# Patient Record
(demographics unavailable — no encounter records)

---

## 2024-12-05 NOTE — HEALTH RISK ASSESSMENT
[Yes] : Yes [2 - 3 times a week (3 pts)] : 2 - 3  times a week (3 points) [1 or 2 (0 pts)] : 1 or 2 (0 points) [Never (0 pts)] : Never (0 points) [No] : In the past 12 months have you used drugs other than those required for medical reasons? No [Little interest or pleasure doing things] : 1) Little interest or pleasure doing things [Feeling down, depressed, or hopeless] : 2) Feeling down, depressed, or hopeless [0] : 2) Feeling down, depressed, or hopeless: Not at all (0) [PHQ-2 Negative - No further assessment needed] : PHQ-2 Negative - No further assessment needed [Time Spent: ___ Minutes] : I spent [unfilled] minutes performing a depression screening for this patient. [Never] : Never [Patient reported mammogram was normal] : Patient reported mammogram was normal [Patient reported PAP Smear was normal] : Patient reported PAP Smear was normal [Patient reported colonoscopy was abnormal] : Patient reported colonoscopy was abnormal [Alone] : lives alone [Employed] : employed [Fully functional (bathing, dressing, toileting, transferring, walking, feeding)] : Fully functional (bathing, dressing, toileting, transferring, walking, feeding) [Fully functional (using the telephone, shopping, preparing meals, housekeeping, doing laundry, using] : Fully functional and needs no help or supervision to perform IADLs (using the telephone, shopping, preparing meals, housekeeping, doing laundry, using transportation, managing medications and managing finances) [de-identified] : active [[id number]] : 0 [Reports changes in hearing] : Reports no changes in hearing [Reports changes in vision] : Reports no changes in vision [MammogramDate] : 02/24 [MammogramComments] : ordered today [PapSmearDate] : 12/23 [ColonoscopyDate] : 2019 [ColonoscopyComments] : removed a few polyps, repeat in 5 yrs from now [FreeTextEntry2] :

## 2024-12-05 NOTE — HISTORY OF PRESENT ILLNESS
[FreeTextEntry1] : 69-year-old female with a past medical history of anal dysplasia, osteoporosis, squamous cell carcinoma (resolved) comes to clinic to establish care.  She does not have any active complaints today.  She would like to get some refills.  She will get the latest flu shot and is up-to-date with the age-appropriate vaccinations.

## 2024-12-05 NOTE — PHYSICAL EXAM
[No Acute Distress] : no acute distress [Well Nourished] : well nourished [Well Developed] : well developed [Well-Appearing] : well-appearing [Normal Sclera/Conjunctiva] : normal sclera/conjunctiva [PERRL] : pupils equal round and reactive to light [EOMI] : extraocular movements intact [Normal Outer Ear/Nose] : the outer ears and nose were normal in appearance [Normal Oropharynx] : the oropharynx was normal [No JVD] : no jugular venous distention [No Lymphadenopathy] : no lymphadenopathy [Supple] : supple [Thyroid Normal, No Nodules] : the thyroid was normal and there were no nodules present [No Respiratory Distress] : no respiratory distress  [No Accessory Muscle Use] : no accessory muscle use [Clear to Auscultation] : lungs were clear to auscultation bilaterally [Normal Rate] : normal rate  [Regular Rhythm] : with a regular rhythm [Normal S1, S2] : normal S1 and S2 [No Murmur] : no murmur heard [Soft] : abdomen soft [Non Tender] : non-tender [Non-distended] : non-distended [No Masses] : no abdominal mass palpated [No HSM] : no HSM [Normal Bowel Sounds] : normal bowel sounds [No CVA Tenderness] : no CVA  tenderness [No Spinal Tenderness] : no spinal tenderness [No Joint Swelling] : no joint swelling [Grossly Normal Strength/Tone] : grossly normal strength/tone [No Rash] : no rash [Coordination Grossly Intact] : coordination grossly intact [No Focal Deficits] : no focal deficits [Normal Gait] : normal gait [Normal Affect] : the affect was normal [Alert and Oriented x3] : oriented to person, place, and time [Normal Insight/Judgement] : insight and judgment were intact

## 2025-07-11 NOTE — HISTORY OF PRESENT ILLNESS
[FreeTextEntry1] : 68 y/o F presents for follow up of HPV  PMH SCC on lower left extremity, s/p excision, followed by Derm annually. Recently diagnosed w/ BCC on forehead (10/12/21) [facility] [place] [facility] of colon cancer in mother, diagnosed in her 80's. [facility] [place] [facility] of breast cancer in sister  h/o (+) anal receptive once  Last colonoscopy 1/3/19 w/ Dr. Smith - Polypoid lesion in anus, frond-like/villous - 4 sessile polyps in sigmoid colon measuring 2-3 mm in size, resected. - Multiple small mouthed diverticula in sigmoid colon  Pathology c/w mucosal prolapse polyps  H/o of labial condyloma in 2002, s/p cryotherapy w/ abnormal pap and s/p colposcopy 1-2 times, reports negative paps since 2008. Last cervical pap negative (9/21/20), negative HPV  Seen in the office 11/8/21, Anal pap performed. No anal fissures, perineal excoriations or warts appreciated. On exam, mild internal hemorrhoids. Close monitoring, routine anal pap surveillance advised for HPV. Anal pap: LSIL (11/10/21), HPV negative  Seen 9/30/22: anal pap performed. No warts. External hemorrhoids, residual hemorrhoidal skin tag noted, small right anterior tag, (+) moderate internal hemorrhoids. Anal pap: Low grade squamous intraepithelial lesion (LSIL)  Seen by GYN [facility] 11/21/22, exam normal. Cervical pap negative, no HPV detected  Seen 3/27/23, Exam notable for Residual hemorrhoidal skin tags were noted. small right anterior tag. Moderate hemorrhoids seen Anal pap LSIL  Office visit 9/29/23, on exam, external hemorrhoid, residual hemorrhoidal skin tags, small right anterior tag. Anal Pap: Low grade squamous intraepithelial lesion, a higher grade lesion cannot be excluded.  In interim patient had Cervical Pap done: Negative for intraepithelial lesion or malignancy, HPV negative.  Seen 1/8/24, anal pap performed. On exam, external hemorrhoid. Residual hemorrhoidal skin tags noted. Small right anterior tag. Moderate internal hemorrhoids. 3 mm posterior anal canal lesion. S/p excised. Anal pap with HPV reflex: LSIL  Surgical Pathology Report - Auth (Verified) 1. Posterior anal canal, biopsy: - Low-grade squamous intraepithelial lesion (condyloma acuminata) with surface acute inflammation  Last seen 07/08/2024 for follow up Anal Pap: NEGATIVE FOR INTRAEPITHELIAL LESION OR MALIGNANCY  Patient presents today for follow up.  Overall patient feels well.  Denies any anorectal changes. Bowel movement is normal without any constipation.  Currently not on any NSAIDS or any anticoagulants.

## 2025-07-11 NOTE — PHYSICAL EXAM
[Excoriation] : no perianal excoriation [Wart] : no warts [Skin Tags] : residual hemorrhoidal skin tags were noted [Normal] : was normal [None] : there was no rectal mass  [de-identified] : Anal pap performed [de-identified] : small right anterior tag [FreeTextEntry1] : Medical assistant was present for the entire exam.  Anoscopy was performed for evaluation of the patients rectal bleeding  history . The risks, benefits and alternatives were reviewed.  A lighted anoscope was passed into the anal canal and the entire anal mucosal surface was inspected..   The findings revealed moderate internal hemorrhoids. No masses or lesions

## 2025-07-11 NOTE — ASSESSMENT
[FreeTextEntry1] : I have reviewed with the patient the clinical and natural history of human papilloma virus and its relationship to the anus. The risks and associated consequences including sexual transmission, anal warts, anal dysplasia, and the risk of anal cancer have been outlined. The need for long-term surveillance and followup has been detailed. The treatment options including high resolution anoscopy and its associated risk of recurrence and post procedure stricture and pain compared to continued close surveillance and monitoring were reviewed. The patient wishes to proceed with surveillance and management of identified visible/palpable lesions as identified or high grade ( HGSIL) dysplasia identified on cytology.